# Patient Record
Sex: FEMALE | Race: BLACK OR AFRICAN AMERICAN | NOT HISPANIC OR LATINO | Employment: UNEMPLOYED | URBAN - METROPOLITAN AREA
[De-identification: names, ages, dates, MRNs, and addresses within clinical notes are randomized per-mention and may not be internally consistent; named-entity substitution may affect disease eponyms.]

---

## 2017-01-09 ENCOUNTER — TELEPHONE (OUTPATIENT)
Dept: FAMILY MEDICINE | Facility: CLINIC | Age: 52
End: 2017-01-09

## 2017-01-09 NOTE — TELEPHONE ENCOUNTER
Panel Management    Pt is due for: Physical with pap, Mammogram, Colonoscopy & labs.  Left a voice msg for pt to call back.  Letter sent.  If pt calls back, please help pt schedule appointment(s) above.  Salvador DIAMOND

## 2017-01-09 NOTE — Clinical Note
[facility]  [address] [facility] [address]  [phone]                                                                                                         Loreto Buckner  [address] [place] [id number]      January 11, 2017      Dear Loreto Buckner,    As we spoke on the phone about getting records from [facility]. Attached is a copy of a release of information that require you signature.   You can drop this of to the clinic or mail to the address listed.     Thank you for choosing [facility].  We appreciate the opportunity to serve you and look forward to supporting your healthcare needs in the future.    If you have any questions or concerns, please call me or my staff at [phone].      Sincerely,    Eva MCNEILL MA/Laxmi Barr

## 2017-01-09 NOTE — Clinical Note
[facility]       [address]  [facility] [address]      January 9, 2017      Loreto Buckner  [address]   [facility] [address]          Dear Loreto Buckner, [phone]    At [facility] we care about your health and are committed to providing quality patient care, which includes staying current on preventative cancer screenings.  You can increase your chances of finding and treating cancers through regular screenings.      Our records show that you are due for the following screening(s):    Needs: Physical with pap, Mammogram, Colonoscopy & labs.      Colonoscopy for colon cancer  Recommended every ten years for everyone age 50 and older  Please take a moment to read over the enclosed information packet about colon cancer screening.   We strongly urge our patient's to consider having a colonoscopy done, which is the best screening test available and only needs to be done every 10 years if normal.      Mammogram for breast cancer   Recommended every 1-2 years for women age 50 and older  Mammograms help detect breast cancer, which is the most common cancer among women in the [place].  You may need to start having mammograms earlier and more often if you have had breast cancer, breast problems, or a family history of breast cancer.     Pap Smear for cervical cancer  Recommended every three years for women 21 and older  A Pap test is used to detect cervical cancer.  The test should be taken at least once every three years but women who are at a greater risk for cervical cancer may need to have the test more often.      You are at a greater risk for cervical cancer if:   - You have had a sexually transmitted disease   - You have had more than one sex partner   - You have had an abnormal pap test in the past    You may contact the [facility] at [phone] to schedule the screening test(s) at your earliest convenience.    If you have a My-Chart  Account, you also can schedule this appointment through there.    If you have already had one or all of the above screening tests at another facility, please call us so that we may update your chart.      Your partners in health,      Quality Committee   [facility]/an

## 2017-01-10 NOTE — TELEPHONE ENCOUNTER
Left message for patient to call back. Did she have the colonoscopy/FIT test done with the other clinic as well? If not, can place order.  Rodrigue Young CMA

## 2017-01-10 NOTE — TELEPHONE ENCOUNTER
Pt called clinic back.     Informed pt. Of Previous message. Pt stated that she had a colonoscopy done at [facility] [place], [place] , but is unaware of the date. Pt can be reached at [phone] to further discuss.     Aric Nino, Patient Rep  [facility]

## 2017-01-11 NOTE — TELEPHONE ENCOUNTER
Spoke with Patient. Patient was mailed a ALFONZO to sign and return to the clinic in order for us to obtained [facility] colonoscopy records.  Eva Gillis

## 2017-01-24 ENCOUNTER — TELEPHONE (OUTPATIENT)
Dept: NURSING | Facility: CLINIC | Age: 52
End: 2017-01-24

## 2017-01-24 NOTE — TELEPHONE ENCOUNTER
"Call Type: Triage Call    Presenting Problem: \"I have an appointment at [facility],  can I get the address and time of my appointment?\" Connected to  [employer] on call schedulers.  Triage Note:  Guideline Title: Information Only Call; No Symptom Triage (Adult)  Recommended Disposition: Provide Information or Advice Only  Original Inclination: Wanted to speak with a nurse  Override Disposition:  Intended Action: Follow advice given  Physician Contacted: No  Requesting information regarding scheduled exam, test or procedure; no triage  required. Information provided from approved resources or clinical experience. ?  YES  Requesting regular office appointment ? NO  Sign(s) or symptom(s) associated with a diagnosed condition or with a new illness  ? NO  Requesting information about provider, services or community resources ? NO  Call back to complete assessment/clarification of information from prior caller to  complete triage ? NO  Requesting information and provider is best resource; no triage required. ? NO  Caller not with patient and is unable to provide clinical information about  patient to facilitate triage. ? NO  Requesting provider information for recently scheduled test, procedure; no triage  required. Needed information not available per approved resources or clinical  experience. ? NO  Requesting information not available per approved reference or clinical  experience; no triage required. ? NO  Physician Instructions:  Care Advice:  "

## 2017-01-28 ENCOUNTER — TELEPHONE (OUTPATIENT)
Dept: LAB | Facility: CLINIC | Age: 52
End: 2017-01-28

## 2017-01-28 ENCOUNTER — TELEPHONE (OUTPATIENT)
Dept: FAMILY MEDICINE | Facility: CLINIC | Age: 52
End: 2017-01-28

## 2017-01-28 DIAGNOSIS — N64.59 BREAST SYMPTOM: Primary | ICD-10-CM

## 2017-01-28 NOTE — TELEPHONE ENCOUNTER
Patient is wanting to letter from provider to be able to break her lease at home because she is living on the 3rd floor and she has leg concerns and just had surgery and would like to move, please call patient.

## 2017-01-28 NOTE — TELEPHONE ENCOUNTER
Patient came in to have mammo screening done and was unable to, the tech from the mammo truck stated that pt needed to have provider place in a bilat Diagnostic w/us. Please call pt

## 2017-01-30 NOTE — TELEPHONE ENCOUNTER
Since I do not have any documentation supporting the fact that she had a surgery, patient needs to ask her surgeon for such a note.  Fanny Barr PAC

## 2017-01-30 NOTE — TELEPHONE ENCOUNTER
"Spoke to patient and she stated that she already received a letter  From her \"other primary Dr\".  Delilah Neves MA/  [employer]abela    "

## 2017-02-02 ENCOUNTER — RADIANT APPOINTMENT (OUTPATIENT)
Dept: MAMMOGRAPHY | Facility: CLINIC | Age: 52
End: 2017-02-02
Attending: PHYSICIAN ASSISTANT
Payer: MEDICARE

## 2017-02-02 DIAGNOSIS — N64.59 BREAST SYMPTOM: ICD-10-CM

## 2017-02-02 PROCEDURE — G0204 DX MAMMO INCL CAD BI: HCPCS

## 2017-06-10 ENCOUNTER — OFFICE VISIT (OUTPATIENT)
Dept: URGENT CARE | Facility: URGENT CARE | Age: 52
End: 2017-06-10
Payer: MEDICARE

## 2017-06-10 VITALS
HEART RATE: 68 BPM | DIASTOLIC BLOOD PRESSURE: 81 MMHG | OXYGEN SATURATION: 97 % | SYSTOLIC BLOOD PRESSURE: 115 MMHG | WEIGHT: 252.2 LBS | TEMPERATURE: 98.7 F | BODY MASS INDEX: 41.97 KG/M2

## 2017-06-10 DIAGNOSIS — N61.0 MASTITIS IN FEMALE: Primary | ICD-10-CM

## 2017-06-10 DIAGNOSIS — Z98.890 HISTORY OF BREAST SURGERY: ICD-10-CM

## 2017-06-10 PROCEDURE — 99213 OFFICE O/P EST LOW 20 MIN: CPT | Performed by: FAMILY MEDICINE

## 2017-06-10 RX ORDER — CEPHALEXIN 500 MG/1
500 CAPSULE ORAL 4 TIMES DAILY
Qty: 40 CAPSULE | Refills: 0 | Status: SHIPPED | OUTPATIENT
Start: 2017-06-10 | End: 2017-06-10

## 2017-06-10 RX ORDER — CEPHALEXIN 500 MG/1
500 CAPSULE ORAL 4 TIMES DAILY
Qty: 40 CAPSULE | Refills: 0 | Status: SHIPPED | OUTPATIENT
Start: 2017-06-10 | End: 2017-07-12

## 2017-06-10 NOTE — NURSING NOTE
"Chief Complaint   Patient presents with     Breast Problem     Pt states that she has been having some pain in her left breast. She noticed the knot on Thursday.       Initial /81  Pulse 68  Temp 98.7  F (37.1  C) (Oral)  Wt 252 lb 3.2 oz (114.4 kg)  SpO2 97%  BMI 41.97 kg/m2 Estimated body mass index is 41.97 kg/(m^2) as calculated from the following:    Height as of 12/16/16: 5' 5\" (1.651 m).    Weight as of this encounter: 252 lb 3.2 oz (114.4 kg).  Medication Reconciliation: complete   Daphney Montes MA        "

## 2017-06-10 NOTE — MR AVS SNAPSHOT
"              After Visit Summary   6/10/2017    Loreto Buckner    MRN: [id number]           Patient Information     Date Of Birth          1965        Visit Information        Provider Department      6/10/2017 2:05 PM Néstor Staley MD [facility]        Today's Diagnoses     Mastitis in female    -  1      Care Instructions    Start antibiotics    Keep appointment with breast specialists as scheduled    To emergency room if symptoms acutely worsen              Follow-ups after your visit        Who to contact     If you have questions or need follow up information about today's clinic visit or your schedule please contact [facility] directly at [phone].  Normal or non-critical lab and imaging results will be communicated to you by [employer], letter or phone within 4 business days after the clinic has received the results. If you do not hear from us within 7 days, please contact the clinic through [employer] or phone. If you have a critical or abnormal lab result, we will notify you by phone as soon as possible.  Submit refill requests through [employer] or call your pharmacy and they will forward the refill request to us. Please allow 3 business days for your refill to be completed.          Additional Information About Your Visit        MyChart Information     [employer] lets you send messages to your doctor, view your test results, renew your prescriptions, schedule appointments and more. To sign up, go to [email or web address] . Click on \"Log in\" on the left side of the screen, which will take you to the Welcome page. Then click on \"Sign up Now\" on the right side of the page.     You will be asked to enter the access code listed below, as well as some personal information. Please follow the directions to create your username and password.     Your access code is: [email or web address]  Expires: 2017  3:45 PM     Your access code will  in 90 days. If you need help or " a new code, please call your [facility] or [phone].        Care EveryWhere ID     This is your Care EveryWhere ID. This could be used by other organizations to access your [facility] medical records  [id number]        Your Vitals Were     Pulse Temperature Pulse Oximetry BMI (Body Mass Index)          68 98.7  F (37.1  C) (Oral) 97% 41.97 kg/m2         Blood Pressure from Last 3 Encounters:   06/10/17 115/81   12/16/16 110/70   11/08/16 100/74    Weight from Last 3 Encounters:   06/10/17 252 lb 3.2 oz (114.4 kg)   12/16/16 203 lb (92.1 kg)   11/08/16 203 lb 3 oz (92.2 kg)              Today, you had the following     No orders found for display         Today's Medication Changes          These changes are accurate as of: 6/10/17  3:45 PM.  If you have any questions, ask your nurse or doctor.               Start taking these medicines.        Dose/Directions    cephALEXin 500 MG capsule   Commonly known as:  KEFLEX   Used for:  Mastitis in female   Started by:  Néstor Staley MD        Dose:  500 mg   Take 1 capsule (500 mg) by mouth 4 times daily   Quantity:  40 capsule   Refills:  0            Where to get your medicines      Some of these will need a paper prescription and others can be bought over the counter.  Ask your nurse if you have questions.     Bring a paper prescription for each of these medications     cephALEXin 500 MG capsule                Primary Care Provider Office Phone # Fax #    Laxmi Taras Barr PA-C [phone]       [facility] [address]  [facility] [address]        Thank you!     Thank you for choosing [facility]  for your care. Our goal is always to provide you with excellent care. Hearing back from our patients is one way we can continue to improve our services. Please take a few minutes to complete the written survey that you may receive in the mail after your visit with us. Thank you!             Your  Updated Medication List - Protect others around you: Learn how to safely use, store and throw away your medicines at www.disposemymeds.org.          This list is accurate as of: 6/10/17  3:45 PM.  Always use your most recent med list.                   Brand Name Dispense Instructions for use    atorvastatin 10 MG tablet    LIPITOR     Take 10 mg by mouth       cephALEXin 500 MG capsule    KEFLEX    40 capsule    Take 1 capsule (500 mg) by mouth 4 times daily       Cholecalciferol 4000 UNITS Tabs      Take 4,000 Units by mouth       hydrochlorothiazide 25 MG tablet    HYDRODIURIL     Take 25 mg by mouth       methocarbamol 750 MG tablet    ROBAXIN    30 tablet    Take 1 tablet (750 mg) by mouth 3 times daily as needed for muscle spasms       nicotine polacrilex 2 MG gum    NICORETTE     2 mg       order for DME     1 Box    Equipment being ordered: heel cup       * oxyCODONE-acetaminophen 5-325 MG per tablet    PERCOCET     Take 1-2 tablets by mouth       * oxyCODONE-acetaminophen 5-325 MG per tablet    PERCOCET    30 tablet    Take 1 tablet by mouth every 6 hours as needed for pain (maximum *** tablet(s) per day)       polyethylene glycol powder    MIRALAX/GLYCOLAX     Take 17 g by mouth       TRIUMEQ 600- MG per tablet   Generic drug:  abacavir-dolutegravir-LamiVUDine      Take 1 tablet by mouth       * Notice:  This list has 2 medication(s) that are the same as other medications prescribed for you. Read the directions carefully, and ask your doctor or other care provider to review them with you.

## 2017-06-10 NOTE — PROGRESS NOTES
Loreto Buckner is a 52 year old female who comes in today to     Left breast lumpectomy, 2 spots / lumps removed    Per patient no cancer.  They did procedure to make sure no cancer.    Surgery was a year or so ago.      Pain for a week or two but worse the last couple days.    Can't sleep on side.  Has to sit up to sleep.    Otherwise feeling okay, besides breast.    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  Negative for invasive malignancy    Patient states she had the surgery and then radiation    HIV meds, well controlled on meds    Good counts    Exam    Full physical not done     Mentation and affect are fine    No tremor of speech or extremity    Right breast normal    Left breast swollen and tender laterally/ inferiorly    Difficult to tell if red; skin fairly dark    Mildly warm    Scar present from past surgery, nontender    No drainage present anywhere    ASSESSMENT / PLAN:  (N61.0) Mastitis in female  (primary encounter diagnosis)  Comment: prudent to start antibiotics.  Patient then to follow up with the breast specialists at [facility].  She states she has an appointment with them in the next 1-2 weeks.  Plan: cephALEXin (KEFLEX) 500 MG capsule,         DISCONTINUED: cephALEXin (KEFLEX) 500 MG         capsule        If symptoms acutely worsen be seen promptly;  To emergency room if needed.     (Z98.890) History of breast surgery  Comment: of note about a year since her surgery  Plan: patient to follow up with specialists anyway          I reviewed the patient's medications, allergies, medical history, family history, and social history.    Néstor Staley MD

## 2017-06-10 NOTE — PATIENT INSTRUCTIONS
Start antibiotics    Keep appointment with breast specialists as scheduled    To emergency room if symptoms acutely worsen

## 2017-06-21 ENCOUNTER — OFFICE VISIT (OUTPATIENT)
Dept: URGENT CARE | Facility: URGENT CARE | Age: 52
End: 2017-06-21
Payer: MEDICARE

## 2017-06-21 VITALS
WEIGHT: 253 LBS | SYSTOLIC BLOOD PRESSURE: 125 MMHG | OXYGEN SATURATION: 98 % | HEART RATE: 74 BPM | TEMPERATURE: 98.9 F | DIASTOLIC BLOOD PRESSURE: 87 MMHG | BODY MASS INDEX: 42.1 KG/M2

## 2017-06-21 DIAGNOSIS — N63.0 LUMP OR MASS IN BREAST: Primary | ICD-10-CM

## 2017-06-21 PROCEDURE — 99213 OFFICE O/P EST LOW 20 MIN: CPT | Performed by: NURSE PRACTITIONER

## 2017-06-21 RX ORDER — OXYCODONE AND ACETAMINOPHEN 5; 325 MG/1; MG/1
1 TABLET ORAL EVERY 6 HOURS PRN
Qty: 16 TABLET | Refills: 0 | Status: SHIPPED | OUTPATIENT
Start: 2017-06-21 | End: 2017-06-25

## 2017-06-21 ASSESSMENT — PAIN SCALES - GENERAL: PAINLEVEL: EXTREME PAIN (9)

## 2017-06-21 NOTE — NURSING NOTE
"Chief Complaint   Patient presents with     Breast Pain     left breast- seeing oncologist for surgery       Initial /87 (BP Location: Left arm, Patient Position: Chair, Cuff Size: Adult Large)  Pulse 74  Temp 98.9  F (37.2  C) (Oral)  Wt 253 lb (114.8 kg)  SpO2 98%  BMI 42.1 kg/m2 Estimated body mass index is 42.1 kg/(m^2) as calculated from the following:    Height as of 12/16/16: 5' 5\" (1.651 m).    Weight as of this encounter: 253 lb (114.8 kg).  Medication Reconciliation: complete     Vivien Miguel CMA      "

## 2017-06-21 NOTE — PROGRESS NOTES
SUBJECTIVE:                                                    Loreto Buckner is a 52 year old female who presents to clinic today for the following health issues:      Breast pain      Duration: ongoing    Description (location/character/radiation): left breast     Intensity:  9/10    Accompanying signs and symptoms: swelling    History (similar episodes/previous evaluation): None    Precipitating or alleviating factors: None    Therapies tried and outcome: meds from 6/10/17, tylenol and ibuprofen doesn't work but percodet only helps         Allergies   Allergen Reactions     Lisinopril Swelling     Of lips, 2 days after starting Lisinopril for the 1st time.     Hydrocodone-Acetaminophen Nausea and Vomiting       Past Medical History:   Diagnosis Date     Cancer (H)     breast cancer            Social History   Substance Use Topics     Smoking status: Current Some Day Smoker     Types: Cigarettes     Smokeless tobacco: Not on file     Alcohol use Not on file       ROS:  GEN no fevers  SKIN as above  Musculoskel: + as above    OBJECTIVE:  /87 (BP Location: Left arm, Patient Position: Chair, Cuff Size: Adult Large)  Pulse 74  Temp 98.9  F (37.2  C) (Oral)  Wt 253 lb (114.8 kg)  SpO2 98%  BMI 42.1 kg/m2   General:   awake, alert, and cooperative.  NAD.   Head: Normocephalic, atraumatic.  Eyes: Conjunctiva clear,   MS:  A tender firm egg size lump palpated on the left breast.  Neuro: Alert and oriented - normal speech.    ASSESSMENT:    ICD-10-CM    1. Lump or mass in breast N63 oxyCODONE-acetaminophen (PERCOCET) 5-325 MG per tablet       PLAN:   History of breast cancer, has an appointment with oncology on 6/27/2017.  Patient educational/instructional material provided including reasons for follow-up    The patient indicates understanding of these issues and agrees with the plan.  Alejandra Oneill  [facility]  Family Nurse Practitoner

## 2017-06-21 NOTE — MR AVS SNAPSHOT
After Visit Summary   6/21/2017    Loreto Buckner    MRN: [id number]           Patient Information     Date Of Birth          1965        Visit Information        Provider Department      6/21/2017 4:50 PM Alejandra Oneill NP [facility]        Today's Diagnoses     Lump or mass in breast    -  1      Care Instructions      Breast Lump, Uncertain Cause    A lump was found in your breast. Most breast lumps are not cancer. They may be caused by normal changes in the breast tissue due to hormone variations that occur with your menstrual cycle. Some women may form lumps that are painful and tender. Others may form lumps that are painless.  At this time, is not possible to be certain of the cause of your lump without further evaluation. This could include:    Another exam by your healthcare provider or a gynecologist    Imaging tests, such as a mammogram or ultrasound    Biopsy (procedure to remove small tissue samples from the breast lump)  Your healthcare provider will explain any additional testing that is needed. Be sure to get answers to any questions you may have.  Home care  Until a diagnosis is made, you may be advised to do the following:    If you are having breast pain:    Take an over-the-counter pain reliever, if directed to by your provider.    Wear a well-fitted bra or sports bra for extra support. If you have breast pain at night, try wearing the bra during sleep.    Apply a warm compress (towel soaked in warm water) to the breast. You may also use a hot water bottle.    Check your breasts each day. Keep a log of whether the lump seems to be changing in size or tenderness with your period. This can help your healthcare provider make the correct diagnosis.  Follow-up care  Follow up with your healthcare provider as directed. Keep all appointments. Also, prepare for any upcoming tests as directed.  When to seek medical advice  Call your healthcare provider right away if any  "of these occur:    Fever of 100.4 F (38 C) or higher    Redness or swelling of the breast    Discharge from the nipple    Visible changes in the skin over the nipple or breast    Lump grows larger, feels very hard, or has an irregular shape    New lumps form  Date Last Reviewed: 4/26/2015 2000-2017 The [employer]. [address] [place] [address]. All rights reserved. This information is not intended as a substitute for professional medical care. Always follow your healthcare professional's instructions.                Follow-ups after your visit        Who to contact     If you have questions or need follow up information about today's clinic visit or your schedule please contact [facility] directly at [phone].  Normal or non-critical lab and imaging results will be communicated to you by MyChart, letter or phone within 4 business days after the clinic has received the results. If you do not hear from us within 7 days, please contact the clinic through MyChart or phone. If you have a critical or abnormal lab result, we will notify you by phone as soon as possible.  Submit refill requests through Protecode or call your pharmacy and they will forward the refill request to us. Please allow 3 business days for your refill to be completed.          Additional Information About Your Visit        [employer] Information     Protecode lets you send messages to your doctor, view your test results, renew your prescriptions, schedule appointments and more. To sign up, go to [email or web address] . Click on \"Log in\" on the left side of the screen, which will take you to the Welcome page. Then click on \"Sign up Now\" on the right side of the page.     You will be asked to enter the access code listed below, as well as some personal information. Please follow the directions to create your username and password.     Your access code is: [email or web address]  Expires: 9/8/2017  3:45 PM     Your " access code will  in 90 days. If you need help or a new code, please call your [facility] clinic or [phone].        Care EveryWhere ID     This is your Care EveryWhere ID. This could be used by other organizations to access your [facility] medical records  [id number]        Your Vitals Were     Pulse Temperature Pulse Oximetry BMI (Body Mass Index)          74 98.9  F (37.2  C) (Oral) 98% 42.1 kg/m2         Blood Pressure from Last 3 Encounters:   17 125/87   06/10/17 115/81   16 110/70    Weight from Last 3 Encounters:   17 253 lb (114.8 kg)   06/10/17 252 lb 3.2 oz (114.4 kg)   16 203 lb (92.1 kg)              Today, you had the following     No orders found for display         Today's Medication Changes          These changes are accurate as of: 17  5:31 PM.  If you have any questions, ask your nurse or doctor.               These medicines have changed or have updated prescriptions.        Dose/Directions    * oxyCODONE-acetaminophen 5-325 MG per tablet   Commonly known as:  PERCOCET   This may have changed:  Another medication with the same name was added. Make sure you understand how and when to take each.   Changed by:  Laxmi Barr PA-C        Dose:  1-2 tablet   Take 1-2 tablets by mouth   Refills:  0       * oxyCODONE-acetaminophen 5-325 MG per tablet   Commonly known as:  PERCOCET   This may have changed:  Another medication with the same name was added. Make sure you understand how and when to take each.   Used for:  Low back sprain, initial encounter, Fall down stairs, initial encounter   Changed by:  Laxmi Barr PA-C        Dose:  1 tablet   Take 1 tablet by mouth every 6 hours as needed for pain (maximum *** tablet(s) per day)   Quantity:  30 tablet   Refills:  0       * oxyCODONE-acetaminophen 5-325 MG per tablet   Commonly known as:  PERCOCET   This may have changed:  You were already taking a medication with the same name,  and this prescription was added. Make sure you understand how and when to take each.   Used for:  Lump or mass in breast   Changed by:  Alejandra Oneill NP        Dose:  1 tablet   Take 1 tablet by mouth every 6 hours as needed for pain maximum 4 tablet(s) per day   Quantity:  16 tablet   Refills:  0       * Notice:  This list has 3 medication(s) that are the same as other medications prescribed for you. Read the directions carefully, and ask your doctor or other care provider to review them with you.         Where to get your medicines      Some of these will need a paper prescription and others can be bought over the counter.  Ask your nurse if you have questions.     Bring a paper prescription for each of these medications     oxyCODONE-acetaminophen 5-325 MG per tablet                Primary Care Provider Office Phone # Fax #    Laxmi Barr PA-C [phone]       [facility] [address]  [facility] [address]        Equal Access to Services     PRO HARRIS AH: Hadii aad ku hadasho Soomaali, waaxda luqadaha, qaybta kaalmada adeegyada, waxay idiin hayaan adeleeg kharacourt de león . So [facility] [place] [phone].    ATENCIÓN: Si habla español, tiene a franz disposición servicios gratuitos de asistencia lingüística. Ll[facility] [phone].    We comply with applicable federal civil rights laws and [place] laws. We do not discriminate on the basis of race, color, national origin, age, disability sex, sexual orientation or gender identity.            Thank you!     Thank you for choosing [facility]  for your care. Our goal is always to provide you with excellent care. Hearing back from our patients is one way we can continue to improve our services. Please take a few minutes to complete the written survey that you may receive in the mail after your visit with us. Thank you!             Your Updated Medication List - Protect others around you: Learn how to safely  use, store and throw away your medicines at www.disposemymeds.org.          This list is accurate as of: 6/21/17  5:31 PM.  Always use your most recent med list.                   Brand Name Dispense Instructions for use Diagnosis    atorvastatin 10 MG tablet    LIPITOR     Take 10 mg by mouth        cephALEXin 500 MG capsule    KEFLEX    40 capsule    Take 1 capsule (500 mg) by mouth 4 times daily    Mastitis in female       Cholecalciferol 4000 UNITS Tabs      Take 4,000 Units by mouth        hydrochlorothiazide 25 MG tablet    HYDRODIURIL     Take 25 mg by mouth        methocarbamol 750 MG tablet    ROBAXIN    30 tablet    Take 1 tablet (750 mg) by mouth 3 times daily as needed for muscle spasms    Low back sprain, initial encounter       nicotine polacrilex 2 MG gum    NICORETTE     2 mg        order for DME     1 Box    Equipment being ordered: heel cup    Plantar fasciitis       * oxyCODONE-acetaminophen 5-325 MG per tablet    PERCOCET     Take 1-2 tablets by mouth        * oxyCODONE-acetaminophen 5-325 MG per tablet    PERCOCET    30 tablet    Take 1 tablet by mouth every 6 hours as needed for pain (maximum *** tablet(s) per day)    Low back sprain, initial encounter, Fall down stairs, initial encounter       * oxyCODONE-acetaminophen 5-325 MG per tablet    PERCOCET    16 tablet    Take 1 tablet by mouth every 6 hours as needed for pain maximum 4 tablet(s) per day    Lump or mass in breast       polyethylene glycol powder    MIRALAX/GLYCOLAX     Take 17 g by mouth        TRIUMEQ 600- MG per tablet   Generic drug:  abacavir-dolutegravir-LamiVUDine      Take 1 tablet by mouth        * Notice:  This list has 3 medication(s) that are the same as other medications prescribed for you. Read the directions carefully, and ask your doctor or other care provider to review them with you.

## 2017-06-21 NOTE — PATIENT INSTRUCTIONS
Breast Lump, Uncertain Cause    A lump was found in your breast. Most breast lumps are not cancer. They may be caused by normal changes in the breast tissue due to hormone variations that occur with your menstrual cycle. Some women may form lumps that are painful and tender. Others may form lumps that are painless.  At this time, is not possible to be certain of the cause of your lump without further evaluation. This could include:    Another exam by your healthcare provider or a gynecologist    Imaging tests, such as a mammogram or ultrasound    Biopsy (procedure to remove small tissue samples from the breast lump)  Your healthcare provider will explain any additional testing that is needed. Be sure to get answers to any questions you may have.  Home care  Until a diagnosis is made, you may be advised to do the following:    If you are having breast pain:    Take an over-the-counter pain reliever, if directed to by your provider.    Wear a well-fitted bra or sports bra for extra support. If you have breast pain at night, try wearing the bra during sleep.    Apply a warm compress (towel soaked in warm water) to the breast. You may also use a hot water bottle.    Check your breasts each day. Keep a log of whether the lump seems to be changing in size or tenderness with your period. This can help your healthcare provider make the correct diagnosis.  Follow-up care  Follow up with your healthcare provider as directed. Keep all appointments. Also, prepare for any upcoming tests as directed.  When to seek medical advice  Call your healthcare provider right away if any of these occur:    Fever of 100.4 F (38 C) or higher    Redness or swelling of the breast    Discharge from the nipple    Visible changes in the skin over the nipple or breast    Lump grows larger, feels very hard, or has an irregular shape    New lumps form  Date Last Reviewed: 4/26/2015 2000-2017 [employer]. [address] [place] [address],  [place] [address]. All rights reserved. This information is not intended as a substitute for professional medical care. Always follow your healthcare professional's instructions.

## 2017-07-12 ENCOUNTER — OFFICE VISIT (OUTPATIENT)
Dept: URGENT CARE | Facility: URGENT CARE | Age: 52
End: 2017-07-12
Payer: MEDICARE

## 2017-07-12 VITALS
SYSTOLIC BLOOD PRESSURE: 126 MMHG | BODY MASS INDEX: 41.93 KG/M2 | OXYGEN SATURATION: 99 % | DIASTOLIC BLOOD PRESSURE: 80 MMHG | HEART RATE: 79 BPM | RESPIRATION RATE: 15 BRPM | TEMPERATURE: 97.9 F | WEIGHT: 252 LBS

## 2017-07-12 DIAGNOSIS — M54.50 ACUTE BILATERAL LOW BACK PAIN WITHOUT SCIATICA: Primary | ICD-10-CM

## 2017-07-12 PROCEDURE — 99213 OFFICE O/P EST LOW 20 MIN: CPT | Performed by: NURSE PRACTITIONER

## 2017-07-12 NOTE — PROGRESS NOTES
SUBJECTIVE:                                                    Loreto Buckner is a 52 year old female who presents to clinic today for the following health issues:      Back Pain       Duration: 3 day        Specific cause: lifting, walking    Description:   Location of pain: upper back left  Character of pain: sharp  Pain radiation:none  New numbness or weakness in legs, not attributed to pain:  no     Intensity: Currently 10/10, moderate    History:   Pain interferes with job: YES,   History of back problems: no prior back problems  Any previous MRI or X-rays: None  Sees a specialist for back pain:  No  Therapies tried without relief: as, tylenol muscle relaxants    Alleviating factors:   Improved by: nothing      Precipitating factors:  Worsened by: Bending, Standing and Walking    Functional and Psychosocial Screen (Analy STarT Back):      Not performed today    Jeremie Steven MA        Accompanying Signs & Symptoms:  Risk of Fracture:  None  Risk of Cauda Equina:  None  Risk of Infection:  None  Risk of Cancer:  None  Risk of Ankylosing Spondylitis:  Onset at age <35, male, AND morning back stiffness. no       Allergies   Allergen Reactions     Lisinopril Swelling     Of lips, 2 days after starting Lisinopril for the 1st time.     Hydrocodone-Acetaminophen Nausea and Vomiting       Past Medical History:   Diagnosis Date     Cancer (H)     breast cancer          Current Outpatient Prescriptions on File Prior to Visit:  atorvastatin (LIPITOR) 10 MG tablet Take 10 mg by mouth   hydrochlorothiazide (HYDRODIURIL) 25 MG tablet Take 25 mg by mouth   nicotine polacrilex (NICORETTE) 2 MG gum 2 mg   order for DME Equipment being ordered: heel cup   polyethylene glycol (MIRALAX/GLYCOLAX) powder Take 17 g by mouth   abacavir-dolutegravir-LamiVUDine (TRIUMEQ) 600- MG per tablet Take 1 tablet by mouth   Cholecalciferol 4000 UNITS TABS Take 4,000 Units by mouth   methocarbamol (ROBAXIN) 750 MG tablet Take 1 tablet  (750 mg) by mouth 3 times daily as needed for muscle spasms (Patient not taking: Reported on 7/12/2017)     No current facility-administered medications on file prior to visit.     No past surgical history on file.    Social History     Social History     Marital status: Single     Spouse name: N/A     Number of children: N/A     Years of education: N/A     Occupational History     Not on file.     Social History Main Topics     Smoking status: Current Some Day Smoker     Types: Cigarettes     Smokeless tobacco: Not on file     Alcohol use Not on file     Drug use: Not on file     Sexual activity: Not on file     Other Topics Concern     Not on file     Social History Narrative       REVIEW OF SYSTEMS  General: negative for fever  Resp: negative for chest pain   CV: negative for chest pain  : negative for dysuria , incontinence, frequency  Musculoskeletal: as above  Neurologic: negative for ataxia, saddle anesthesia, fecal incontinence, one sided weakness,  paresthesias    Physical Exam:  Vitals: /80  Pulse 79  Temp 97.9  F (36.6  C)  Resp 15  Wt 252 lb (114.3 kg)  SpO2 99%  BMI 41.93 kg/m2  BMI= Body mass index is 41.93 kg/(m^2).  Constitutional: healthy, alert and no acute distress   CARDIO: RRR, no MRG  RESP: lungs CTA, NAD  NEURO: Patellar reflexes intact and equal b/l  BACK:  Straight leg raise intact, tender on palpation of lumbar paraspinal muscle TTP, strength intact and equal b/l lower extremities  GAIT: intact  Psychiatric: mentation appears normal and affect normal/bright      Impression: Back pain, uncomplicated.     ICD-10-CM    1. Acute bilateral low back pain without sciatica M54.5        Plan:  Loreto would like an opioid medication, I recommedmed Toradol but she declined. I advised her that a NSAID would be the first medication to try in addition to physical therapy but she is only wants an opioid.   Instructions for back care and return precautions discussed.  I advised her to follow  up with her PCP.    Alejandra Oneill  FN-BC  Family Nurse Practitoner

## 2017-07-12 NOTE — MR AVS SNAPSHOT
"              After Visit Summary   2017    Loreto Buckner    MRN: [id number]           Patient Information     Date Of Birth          1965        Visit Information        Provider Department      2017 6:40 PM Alejandra Oneill NP [facility]        Today's Diagnoses     Acute bilateral low back pain without sciatica    -  1       Follow-ups after your visit        Follow-up notes from your care team     Return if symptoms worsen or fail to improve.      Who to contact     If you have questions or need follow up information about today's clinic visit or your schedule please contact [facility] directly at [phone].  Normal or non-critical lab and imaging results will be communicated to you by MyChart, letter or phone within 4 business days after the clinic has received the results. If you do not hear from us within 7 days, please contact the clinic through MyChart or phone. If you have a critical or abnormal lab result, we will notify you by phone as soon as possible.  Submit refill requests through Tittat or call your pharmacy and they will forward the refill request to us. Please allow 3 business days for your refill to be completed.          Additional Information About Your Visit        MyChart Information     Tittat lets you send messages to your doctor, view your test results, renew your prescriptions, schedule appointments and more. To sign up, go to [email or web address] . Click on \"Log in\" on the left side of the screen, which will take you to the Welcome page. Then click on \"Sign up Now\" on the right side of the page.     You will be asked to enter the access code listed below, as well as some personal information. Please follow the directions to create your username and password.     Your access code is: [email or web address]  Expires: 2017  3:45 PM     Your access code will  in 90 days. If you need help or a new code, please call your [facility] or [phone].        Care EveryWhere ID     This is your Care EveryWhere ID. This could be used by other organizations to access your [facility] medical records  [id number]        Your Vitals Were     Pulse Temperature Respirations Pulse Oximetry BMI (Body Mass Index)       79 97.9  F (36.6  C) 15 99% 41.93 kg/m2        Blood Pressure from Last 3 Encounters:   07/12/17 126/80   06/21/17 125/87   06/10/17 115/81    Weight from Last 3 Encounters:   07/12/17 252 lb (114.3 kg)   06/21/17 253 lb (114.8 kg)   06/10/17 252 lb 3.2 oz (114.4 kg)              Today, you had the following     No orders found for display       Primary Care Provider Office Phone # Fax #    Laxmi Barr PA-C [phone]       [facility] [address]  [facility] [address]        Equal Access to Services     PRO HARRIS : Hadii aad ku hadasho Soomaali, waaxda luqadaha, qaybta kaalmada adeegyada, waxay idiin hayshain satish kharash sarthak . So [facility] [phone].    ATENCIÓN: Si habla español, tiene a franz disposición servicios gratuitos de asistencia lingüística. Llame al [phone].    We comply with applicable federal civil rights laws and [place] laws. We do not discriminate on the basis of race, color, national origin, age, disability sex, sexual orientation or gender identity.            Thank you!     Thank you for choosing [facility]  for your care. Our goal is always to provide you with excellent care. Hearing back from our patients is one way we can continue to improve our services. Please take a few minutes to complete the written survey that you may receive in the mail after your visit with us. Thank you!             Your Updated Medication List - Protect others around you: Learn how to safely use, store and throw away your medicines at www.disposemymeds.org.          This list is accurate as of: 7/12/17  7:23 PM.  Always use your most recent med list.                    Brand Name Dispense Instructions for use Diagnosis    atorvastatin 10 MG tablet    LIPITOR     Take 10 mg by mouth        Cholecalciferol 4000 UNITS Tabs      Take 4,000 Units by mouth        hydrochlorothiazide 25 MG tablet    HYDRODIURIL     Take 25 mg by mouth        methocarbamol 750 MG tablet    ROBAXIN    30 tablet    Take 1 tablet (750 mg) by mouth 3 times daily as needed for muscle spasms    Low back sprain, initial encounter       nicotine polacrilex 2 MG gum    NICORETTE     2 mg        order for DME     1 Box    Equipment being ordered: heel cup    Plantar fasciitis       polyethylene glycol powder    MIRALAX/GLYCOLAX     Take 17 g by mouth        TRIUMEQ 600- MG per tablet   Generic drug:  abacavir-dolutegravir-LamiVUDine      Take 1 tablet by mouth

## 2017-09-14 NOTE — TELEPHONE ENCOUNTER
Reason for Call:  Other appointment    Detailed comments: Patient was returning call back from SOLEDAD Flores.//Informed patient that she is due for a AFE w/ pap, mammo, Colonoscopy, and labs.//Patient states that she had a physical done at another clinic...//I scheduled patient for a mammo on Friday, 01/27/2016 @ 2pm.//Please advise. Thank you.    Phone Number Patient can be reached at: Cell number on file:    Telephone Information:   Mobile [phone]       Best Time: Anytime    Can we leave a detailed message on this number? YES    Call taken on 1/9/2017 at 5:17 PM by Chuy Husain       None

## 2017-10-15 ENCOUNTER — OFFICE VISIT (OUTPATIENT)
Dept: URGENT CARE | Facility: URGENT CARE | Age: 52
End: 2017-10-15
Payer: MEDICARE

## 2017-10-15 ENCOUNTER — RADIANT APPOINTMENT (OUTPATIENT)
Dept: GENERAL RADIOLOGY | Facility: CLINIC | Age: 52
End: 2017-10-15
Attending: NURSE PRACTITIONER
Payer: MEDICARE

## 2017-10-15 VITALS
OXYGEN SATURATION: 100 % | HEART RATE: 71 BPM | TEMPERATURE: 98.4 F | DIASTOLIC BLOOD PRESSURE: 88 MMHG | SYSTOLIC BLOOD PRESSURE: 124 MMHG

## 2017-10-15 DIAGNOSIS — S99.912A ANKLE INJURY, LEFT, INITIAL ENCOUNTER: Primary | ICD-10-CM

## 2017-10-15 PROCEDURE — 99213 OFFICE O/P EST LOW 20 MIN: CPT | Performed by: NURSE PRACTITIONER

## 2017-10-15 PROCEDURE — 73610 X-RAY EXAM OF ANKLE: CPT | Mod: LT

## 2017-10-15 ASSESSMENT — PAIN SCALES - GENERAL: PAINLEVEL: EXTREME PAIN (9)

## 2017-10-15 NOTE — NURSING NOTE
"Chief Complaint   Patient presents with     Trauma      L ankle/heel       Initial /88 (BP Location: Left arm, Patient Position: Chair, Cuff Size: Adult Large)  Pulse 71  Temp 98.4  F (36.9  C) (Oral)  SpO2 100% Estimated body mass index is 41.93 kg/(m^2) as calculated from the following:    Height as of 12/16/16: 5' 5\" (1.651 m).    Weight as of 7/12/17: 252 lb (114.3 kg).  Medication Reconciliation: skye Miguel CMA      "

## 2017-10-15 NOTE — PROGRESS NOTES
SUBJECTIVE:   Loreto Buckner is a 52 year old female who presents to clinic today for the following health issues:      Musculoskeletal problem/pain      Duration: today    Description  Location: fell and twisted left heel/ankle at the bathroom    Intensity:  9/10    Accompanying signs and symptoms: radiation of pain to upper leg, tingling, warmth and swelling    History  Previous similar problem: no   Previous evaluation:  none    Precipitating or alleviating factors:  Trauma or overuse: YES  Aggravating factors include: standing, walking, climbing stairs and lifting    Therapies tried and outcome: nothing         Past Medical History:   Diagnosis Date     Cancer (H)     breast cancer      History   Smoking Status     Current Some Day Smoker     Types: Cigarettes   Smokeless Tobacco     Not on file       ROS:  GEN no fevers  SKIN no erythema  Musculoskeletal:  See HPI.      OBJECTIVE:  Blood pressure 124/88, pulse 71, temperature 98.4  F (36.9  C), temperature source Oral, SpO2 100 %, not currently breastfeeding.  Patient is alert and NAD.  EYES: conjunctiva clear  Ankle Exam (left):  Inspection:no swelling seen  Palpation:tender over lateral malleolus  Cap refill intact.    Good doralis pedis.  Neurovascularly Intact Distally.     XR no fx/fb     ASSESSMENT:    ICD-10-CM    1. Ankle injury, left, initial encounter S99.912A XR Ankle Left G/E 3 Views         PLAN:  I discussed xray results with the patient.  Advised PT-Patient has declined  She is asking for oxycodone, I encouraged her to use ibuprofen-she declined.  Ice, elevate, lowly increase activity level with active range of motion exercises encouraged.  Alejandra Oneill  FN-BC  Family Nurse Practitoner

## 2017-10-15 NOTE — MR AVS SNAPSHOT
"              After Visit Summary   10/15/2017    Loreto Buckner    MRN: [id number]           Patient Information     Date Of Birth          1965        Visit Information        Provider Department      10/15/2017 2:10 PM Alejandra Oneill NP [facility]        Today's Diagnoses     Ankle injury, left, initial encounter    -  1       Follow-ups after your visit        Who to contact     If you have questions or need follow up information about today's clinic visit or your schedule please contact [facility] directly at [phone].  Normal or non-critical lab and imaging results will be communicated to you by [employer], letter or phone within 4 business days after the clinic has received the results. If you do not hear from us within 7 days, please contact the clinic through [employer] or phone. If you have a critical or abnormal lab result, we will notify you by phone as soon as possible.  Submit refill requests through [employer] or call your pharmacy and they will forward the refill request to us. Please allow 3 business days for your refill to be completed.          Additional Information About Your Visit        MyChart Information     [employer] lets you send messages to your doctor, view your test results, renew your prescriptions, schedule appointments and more. To sign up, go to [email or web address] . Click on \"Log in\" on the left side of the screen, which will take you to the Welcome page. Then click on \"Sign up Now\" on the right side of the page.     You will be asked to enter the access code listed below, as well as some personal information. Please follow the directions to create your username and password.     Your access code is: [email or web address]  Expires: 2018  2:56 PM     Your access code will  in 90 days. If you need help or a new code, please call your [facility] or [phone].        Care EveryWhere ID     This is your Care EveryWhere ID. This could be " used by other organizations to access your [facility] medical records  [id number]        Your Vitals Were     Pulse Temperature Pulse Oximetry             71 98.4  F (36.9  C) (Oral) 100%          Blood Pressure from Last 3 Encounters:   10/15/17 124/88   07/12/17 126/80   06/21/17 125/87    Weight from Last 3 Encounters:   07/12/17 252 lb (114.3 kg)   06/21/17 253 lb (114.8 kg)   06/10/17 252 lb 3.2 oz (114.4 kg)              We Performed the Following     XR Ankle Left G/E 3 Views        Primary Care Provider Office Phone # Fax #    Laxmi Barr PA-C [phone]       [address]  [facility] [address]        Equal Access to Services     PARKER HARRIS : Hadii aad ku hadasho Soomaali, waaxda luqadaha, qaybta kaalmada adeegyada, angela de león . So [facility] [phone].    ATENCIÓN: Si habla español, tiene a franz disposición servicios gratuitos de asistencia lingüística. Darcie al [phone].    We comply with applicable federal civil rights laws and [place] laws. We do not discriminate on the basis of race, color, national origin, age, disability, sex, sexual orientation, or gender identity.            Thank you!     Thank you for choosing [facility]  for your care. Our goal is always to provide you with excellent care. Hearing back from our patients is one way we can continue to improve our services. Please take a few minutes to complete the written survey that you may receive in the mail after your visit with us. Thank you!             Your Updated Medication List - Protect others around you: Learn how to safely use, store and throw away your medicines at www.disposemymeds.org.          This list is accurate as of: 10/15/17  2:56 PM.  Always use your most recent med list.                   Brand Name Dispense Instructions for use Diagnosis    atorvastatin 10 MG tablet    LIPITOR     Take 10 mg by mouth        Cholecalciferol 4000  UNITS Tabs      Take 4,000 Units by mouth        hydrochlorothiazide 25 MG tablet    HYDRODIURIL     Take 25 mg by mouth        methocarbamol 750 MG tablet    ROBAXIN    30 tablet    Take 1 tablet (750 mg) by mouth 3 times daily as needed for muscle spasms    Low back sprain, initial encounter       nicotine polacrilex 2 MG gum    NICORETTE     2 mg        order for DME     1 Box    Equipment being ordered: heel cup    Plantar fasciitis       polyethylene glycol powder    MIRALAX/GLYCOLAX     Take 17 g by mouth        TRIUMEQ 600- MG per tablet   Generic drug:  abacavir-dolutegravir-LamiVUDine      Take 1 tablet by mouth

## 2017-10-29 ENCOUNTER — HEALTH MAINTENANCE LETTER (OUTPATIENT)
Age: 52
End: 2017-10-29

## 2020-04-05 ENCOUNTER — NURSE TRIAGE (OUTPATIENT)
Dept: NURSING | Facility: CLINIC | Age: 55
End: 2020-04-05

## 2020-04-05 NOTE — TELEPHONE ENCOUNTER
"Pt stats she needs to see a doctor.  She has been vomiting x 2-3 days, she is not able to keep fluids down without vomiting.  No cough, SOB, fever, rash, known COVID contact.       Disposition:  ED.  She verbalized understanding and had no further questions.     Oneida Gilliam RN/AGUSTIN    Reason for Disposition    [1] SEVERE vomiting (e.g., 6 or more times/day) AND [2] present > 8 hours    Additional Information    Negative: Shock suspected (e.g., cold/pale/clammy skin, too weak to stand, low BP, rapid pulse)    Negative: Difficult to awaken or acting confused (e.g., disoriented, slurred speech)    Negative: Sounds like a life-threatening emergency to the triager    Negative: [1] Vomiting AND [2] contains red blood or black (\"coffee ground\") material  (Exception: few red streaks in vomit that only happened once)    Negative: Severe pain in one eye    Negative: Recent head injury (within last 3 days)    Negative: Recent abdominal injury (within last 3 days)    Negative: [1] Insulin-dependent diabetes (Type I) AND [2] glucose > 400 mg/dl (22 mmol/l)    Protocols used: VOMITING-A-AH      "

## 2023-09-22 ENCOUNTER — HOSPITAL ENCOUNTER (EMERGENCY)
Facility: CLINIC | Age: 58
Discharge: HOME | End: 2023-09-22
Admitting: EMERGENCY MEDICINE
Payer: MEDICARE

## 2023-09-22 VITALS
OXYGEN SATURATION: 100 % | TEMPERATURE: 98.4 F | DIASTOLIC BLOOD PRESSURE: 75 MMHG | HEART RATE: 84 BPM | SYSTOLIC BLOOD PRESSURE: 171 MMHG | RESPIRATION RATE: 20 BRPM

## 2023-09-22 PROCEDURE — 99281 EMR DPT VST MAYX REQ PHY/QHP: CPT

## 2023-09-22 NOTE — ED TRIAGE NOTES
Patient arrives ambulatory with N/V, abdominal pain that radiates to the back. N/V started Tuesday. Hx GERD and acid reflux. Recently had a knee replaced 8/14.     Triage Assessment       Row Name 09/22/23 3718       Triage Assessment (Adult)    Airway WDL WDL       Respiratory WDL    Respiratory WDL WDL       Skin Circulation/Temperature WDL    Skin Circulation/Temperature WDL WDL       Cardiac WDL    Cardiac WDL WDL       Peripheral/Neurovascular WDL    Peripheral Neurovascular WDL WDL       Cognitive/Neuro/Behavioral WDL    Cognitive/Neuro/Behavioral WDL WDL